# Patient Record
Sex: FEMALE | URBAN - METROPOLITAN AREA
[De-identification: names, ages, dates, MRNs, and addresses within clinical notes are randomized per-mention and may not be internally consistent; named-entity substitution may affect disease eponyms.]

---

## 2018-01-01 ENCOUNTER — HOSPITAL ENCOUNTER (OUTPATIENT)
Dept: RADIOLOGY | Facility: MEDICAL CENTER | Age: 73
End: 2018-11-12

## 2018-01-01 ENCOUNTER — HOSPICE ADMISSION (OUTPATIENT)
Dept: HOSPICE | Facility: HOSPICE | Age: 73
End: 2018-01-01
Payer: MEDICARE

## 2018-01-01 ENCOUNTER — PATIENT OUTREACH (OUTPATIENT)
Dept: OTHER | Facility: MEDICAL CENTER | Age: 73
End: 2018-01-01

## 2018-01-01 ENCOUNTER — HOSPITAL ENCOUNTER (OUTPATIENT)
Dept: LAB | Facility: MEDICAL CENTER | Age: 73
End: 2018-11-01
Attending: SURGERY
Payer: COMMERCIAL

## 2018-01-01 ENCOUNTER — HOME CARE VISIT (OUTPATIENT)
Dept: HOSPICE | Facility: HOSPICE | Age: 73
End: 2018-01-01
Payer: MEDICARE

## 2018-01-01 ENCOUNTER — APPOINTMENT (OUTPATIENT)
Dept: HOSPICE | Facility: HOSPICE | Age: 73
End: 2018-01-01
Payer: MEDICARE

## 2018-01-01 ENCOUNTER — TELEPHONE (OUTPATIENT)
Dept: HEMATOLOGY ONCOLOGY | Facility: MEDICAL CENTER | Age: 73
End: 2018-01-01

## 2018-01-01 ENCOUNTER — HOSPITAL ENCOUNTER (OUTPATIENT)
Facility: MEDICAL CENTER | Age: 73
End: 2018-11-01
Attending: SURGERY
Payer: COMMERCIAL

## 2018-01-01 VITALS
RESPIRATION RATE: 12 BRPM | HEART RATE: 88 BPM | SYSTOLIC BLOOD PRESSURE: 125 MMHG | DIASTOLIC BLOOD PRESSURE: 81 MMHG | OXYGEN SATURATION: 90 %

## 2018-01-01 VITALS — HEART RATE: 87 BPM | SYSTOLIC BLOOD PRESSURE: 129 MMHG | RESPIRATION RATE: 22 BRPM | DIASTOLIC BLOOD PRESSURE: 76 MMHG

## 2018-01-01 DIAGNOSIS — C34.12 MALIGNANT NEOPLASM OF UPPER LOBE OF LEFT LUNG (HCC): ICD-10-CM

## 2018-01-01 LAB
FORWARD REASON: SPWHY: NORMAL
FORWARDED TO LAB: SPWHR: NORMAL
SPECIMEN SENT: SPWT1: NORMAL

## 2018-01-01 PROCEDURE — S9126 HOSPICE CARE, IN THE HOME, P: HCPCS

## 2018-01-01 PROCEDURE — G0156 HHCP-SVS OF AIDE,EA 15 MIN: HCPCS

## 2018-01-01 PROCEDURE — G0299 HHS/HOSPICE OF RN EA 15 MIN: HCPCS

## 2018-01-01 PROCEDURE — A6250 SKIN SEAL PROTECT MOISTURIZR: HCPCS

## 2018-01-01 PROCEDURE — A5120 SKIN BARRIER, WIPE OR SWAB: HCPCS

## 2018-01-01 PROCEDURE — A4554 DISPOSABLE UNDERPADS: HCPCS

## 2018-01-01 PROCEDURE — 6650303 HCR  ALOE VESTA BATH/BODY SHAMPOO

## 2018-01-01 PROCEDURE — 665036 HSPC NOTICE OF ELECTION NOE

## 2018-01-01 SDOH — ECONOMIC STABILITY: HOUSING INSECURITY: HOME SAFETY: PT LIVES AT A GROUP HOME.

## 2018-01-01 SDOH — ECONOMIC STABILITY: HOUSING INSECURITY: UNSAFE APPLIANCES: 0

## 2018-01-01 SDOH — ECONOMIC STABILITY: HOUSING INSECURITY: UNSAFE COOKING RANGE AREA: 0

## 2018-01-01 ASSESSMENT — ACTIVITIES OF DAILY LIVING (ADL)
AMBULATION_REQUIRES_ASSISTANCE: 1
MONEY MANAGEMENT (EXPENSES/BILLS): TOTALLY DEPENDENT
CONTINENCE_REQUIRES_ASSISTANCE: 1
EATING_REQUIRES_ASSISTANCE: 1
MONEY MANAGEMENT (EXPENSES/BILLS): TOTALLY DEPENDENT
DRESSING_REQUIRES_ASSISTANCE: 1
BATHING_REQUIRES_ASSISTANCE: 1
PHYSICAL_TRANSFER_REQUIRES_ASSISTANCE: 1

## 2018-01-01 ASSESSMENT — ENCOUNTER SYMPTOMS
SLEEP QUALITY: FAIR
STOOL FREQUENCY: LESS THAN DAILY
DESCRIPTION OF MEMORY LOSS: IMMEDIATE
SLEEP QUALITY: ADEQUATE
DESCRIPTION OF MEMORY LOSS: SHORT TERM
STOOL FREQUENCY: LESS THAN DAILY
CONSTIPATION: 1
DESCRIPTION OF MEMORY LOSS: LONG TERM
AGITATION: 1

## 2018-01-01 ASSESSMENT — PATIENT HEALTH QUESTIONNAIRE - PHQ9: CLINICAL INTERPRETATION OF PHQ2 SCORE: 0

## 2018-01-01 ASSESSMENT — SOCIAL DETERMINANTS OF HEALTH (SDOH)
ACTIVE STRESSOR - NO STRESS FACTORS: 1
ACTIVE STRESSOR - NO STRESS FACTORS: 1

## 2018-11-05 NOTE — PROGRESS NOTES
Referral received from Dr Ganser.  Call placed to patient and spoke with , Sammy.  He provided information on what physician had informed them.  Pt will be having MRI done on 12th at [facility] and PET scan on 15th.  Provided education to  regarding PET they are already aware of diet.  Pt states that patient does not understand everything that is told to her and he goes to all of her appointments.  He reports that she understands information better when coming from him.  This is not a language barrier, but more understanding.   provided cell number for contact as well.  Discussed with him that medical oncology referral is in process.  Had called patient care coordination early to verify referral received and being processed.   very grateful.  Provided contact information and will also send out navigation letter.  Continue to follow.

## 2018-11-05 NOTE — LETTER
[facility]   [address]  [place] [address]  Phone: [phone] - Fax: [phone]              Isabela Spencer  [address] [place] [address]     Date: 11/05/18    Dear Isabela,    I am a Cancer Nurse Navigator, a certified oncology nurse. My role is to assess any needs you may have with education, guidance and support. I am available to you and your family from diagnosis through treatment at [facility].       I am available to address your needs during your journey with the following services:     Care Coordination  I can assist you in facilitating communication between your cancer care treatment team to ensure timely treatment and follow-up.  I can also assist with transition of care back to your primary care provider, or other specialist, as needed.  My goal is to bridge gaps for you throughout the course of your active treatment.       Education Services  Understanding the recommended treatment course by your physician is key. I can provide educational resources personalized to your cancer diagnosis to help you understand your diagnosis and treatment. Please let me know if you would like to receive information about your diagnosis and treatment plan.  I am here to help.     Support Services/Resource Information  [facility] we offer a full scope of support services.  I can assist you with referral information to:  · Cancer Clinical Trials & Research  · Nutrition counseling  · Support groups  · Complementary Therapies such as Healing Touch and Mind-Body Techniques Meditation  · Patient Financial Advocates  ·   · [facility], an [employer] affiliate office, our volunteers can assist you with accessing our [employer] library, support services information, head coverings and comfort items  · Community and national resources, included eligibility based ting assistance and pharmaceutical access programs, if you are in need of  additional information.     [employer] [facility] offers services that include:  · Behavioral Health  · Genetic counseling & testing  · Acupuncture  · Lymphedema prevention/treatment program  · Palliative care services.          I hope you have an excellent patient experience.  Please feel free to share with me your comments regarding the care you have received- we value your feedback.    Sincerely,         Tresa Gandhi R.N.  Cancer Nurse Navigator    Main: [phone]  Office:  [phone]   Email:  [email or web address]

## 2018-11-10 NOTE — TELEPHONE ENCOUNTER
I confirmed with the patient's  the patient is to complete her images at [facility] the week of November 12th. Patient is unable to make it for a new patient appointment with Dr. Cam on Nobember 21st since they have prior engagements with their  to sign their Will and Power of  paperwork. She is scheduled with Dr. Cam on Monday 11/26/2018.

## 2018-11-10 NOTE — TELEPHONE ENCOUNTER
I spoke with the patient's  and informed him our office does not accept Medicare Advantage plans. We have routed the referral to [facility] and I gave him the phone number.

## 2018-11-16 NOTE — PROGRESS NOTES
Follow up call to patient and spoke with , Sammy.  He reports had received call from [facility] ([facility] regarding referral.  They are working on getting needed paperwork to schedule patient.  He reports patient continues to not want to eat very much and has lost weight.   is trying to encourage increased nutrition intake and patient does drink ensure to help supplement.  Pt is having headaches, which are currently being managed with pain medication.   reports PCP informed them of MRI results that she has brain metastasis.  Pt did complete PET scan.  Provided education regarding role of cancer nurse navigator.  They did receive letter.  Encouraged them to call if barriers, questions or concerns arise.

## 2018-11-20 NOTE — PROGRESS NOTES
Call placed to Vandana, cancer nurse navigator, with [facility].  Provided information regarding patient, so can follow if becomes inpatient.  Grateful for collaboration.    Pat called reporting took navigator's advice and currently at [facility] ER and is being evaluated.  Did notify him regarding cancer nurse navigator with [facility] as well.

## 2018-11-20 NOTE — PROGRESS NOTES
called reporting patient has not been scheduled yet for oncology consult.  He has been talking to [facility], but unsure if they have the referral.  He reports patient has continued to decline and needs wheelchair to get around.    Call placed to [facility].  Information provided that they do not have an official referral, they did receive notes from Dr Ganser's office.  They stated that in order to get approval to be seen, they would need referral.  Provided information regarding patient needing to be seen as soon as possible.  They will work on getting appointment once processed referral.    Call placed to [facility].  Left message regarding [facility] needing referal.  Left message with [facility] as well regarding referral that was to be routed to [facility].    Call placed back to , Aura.  Asked additional information regarding current decline.  He reports that she is not able to walk anymore using a cane, that she is now using wheelchair.  He feels significant decline from last week when talked.  Provided education regarding current condition and concern for patient, especially with brain MRI information.  Encouraged him to take patient to ER for evaluation.  Discussed oncology would not be able to evaluate acute concerns in the moment and current decline needs to be assessed as soon as possible.  He will contact insurance for preferred provider.  If patient needs to be hospitalized, hopefully she can get an oncology consult at that time as well.  Will continue to work on outpatient referral and appointment.   plans to take patient to ER and keep navigator updated.

## 2018-11-29 NOTE — PROGRESS NOTES
"Call placed to  for follow up on patient status from ER visit.  Left message.    , Aura, returned call.  He reported that things \"happened quickly\" and patient was seen by \"3 oncologists\".  Pat stated that was given information that patient did not have brain metastisis but \"micro strokes\" and currently is in rehab.  He thanked navigators assistance in getting things moving forward.  Explained that [facility] navigator assisted in getting patient needed evaluation.  Pt will be following up with Dr Cano, oncology as well.   reports that patient will need to get stronger prior to any treatment that may be recommended.  Pt no longer in need of cancer nurse navigation through [facility].  Available if needed.  "